# Patient Record
Sex: FEMALE | Race: WHITE | NOT HISPANIC OR LATINO | Employment: FULL TIME | URBAN - METROPOLITAN AREA
[De-identification: names, ages, dates, MRNs, and addresses within clinical notes are randomized per-mention and may not be internally consistent; named-entity substitution may affect disease eponyms.]

---

## 2022-09-23 ENCOUNTER — ANCILLARY PROCEDURE (OUTPATIENT)
Dept: GENERAL RADIOLOGY | Facility: CLINIC | Age: 37
End: 2022-09-23
Attending: EMERGENCY MEDICINE
Payer: COMMERCIAL

## 2022-09-23 ENCOUNTER — OFFICE VISIT (OUTPATIENT)
Dept: URGENT CARE | Facility: URGENT CARE | Age: 37
End: 2022-09-23
Payer: COMMERCIAL

## 2022-09-23 VITALS
SYSTOLIC BLOOD PRESSURE: 144 MMHG | HEIGHT: 66 IN | TEMPERATURE: 97.7 F | OXYGEN SATURATION: 99 % | HEART RATE: 55 BPM | DIASTOLIC BLOOD PRESSURE: 94 MMHG

## 2022-09-23 DIAGNOSIS — S59.902A INJURY OF LEFT ELBOW, INITIAL ENCOUNTER: ICD-10-CM

## 2022-09-23 DIAGNOSIS — R20.2 TINGLING OF LEFT UPPER EXTREMITY: ICD-10-CM

## 2022-09-23 DIAGNOSIS — S59.902A INJURY OF LEFT ELBOW, INITIAL ENCOUNTER: Primary | ICD-10-CM

## 2022-09-23 PROCEDURE — 99204 OFFICE O/P NEW MOD 45 MIN: CPT | Performed by: EMERGENCY MEDICINE

## 2022-09-23 PROCEDURE — 73080 X-RAY EXAM OF ELBOW: CPT | Mod: TC | Performed by: RADIOLOGY

## 2022-09-23 NOTE — PROGRESS NOTES
Assessment & Plan     Diagnosis:    (G45.505I) Injury of left elbow, initial encounter  (primary encounter diagnosis)    (R20.2) Tingling of left upper extremity  Comment: ulnar nerve distribution      Medical Decision Making:  Grazyna Mercedes is a 36 year old female who presents with elbow pain. Of note, she has no neurovascular compromise on exam - notes some tingling sensation but has no focal neurologic deficit on exam, sensation is intact, full range of motion of the hand and good capillary refill in all of his digits. X-ray of the elbow shows no evidence of fracture. Patient with swelling near the medial aspect of the olecranon; I suspect likely this is irritating the ulnar nerve.     Recommended RICE therapy, avoiding bending at the elbow as much as possible and follow up with orthopedics. Go to the ED if she has any numbness, paresthesias, pain with passive range of motion of wrist and hand, or other concerning symptoms. The patient understands these plans and was discharged home with no new questions.      Orlando Wright PA-C  [facility]    Subjective     Grazyna Mercedes is a 36 year old female who presents to clinic today for the following health issues:  Chief Complaint   Patient presents with     Elbow Injury     Last wknd, pt tripped over a root and fell on lt elbow and has been swollen and hasn't gotten better and has been causing irritation       HPI    MS Injury/Pain    Onset of symptoms was ~1 week ago.  Location: left elbow  Context:       The injury happened while walking and tripped; landing mainly on the left elbow.      Mechanism: fall       Patient experienced immediate pain, delayed swelling, was able to bear weight directly after injury, no deformity was noted by the patient  Course of symptoms is improving, but noticed some tingling in the left arm and 4th/5th fingers a couple days ago.   Severity: mild  Current and Associated symptoms: Pain, Swelling, Bruising and  "tingling.  Denies  Warmth and Redness  Aggravating Factors: flexion/extension  Therapies to improve symptoms include: ice    Patient denies any numbness or weakness in the arm or other injuries.     Review of Systems    See HPI    Objective      Vitals: BP (!) 144/94 (BP Location: Right arm, Patient Position: Sitting, Cuff Size: Adult Regular)   Pulse 55   Temp 97.7  F (36.5  C) (Temporal)   Ht 1.676 m (5' 6\")   SpO2 99%       Patient Vitals for the past 24 hrs:   BP Temp Temp src Pulse SpO2 Height   09/23/22 1221 (!) 144/94 97.7  F (36.5  C) Temporal 55 99 % 1.676 m (5' 6\")       Vital signs reviewed by: Orlando Wright PA-C    Physical Exam   Constitutional: Patient is alert and cooperative. No acute distress.  Neck: No midline C-spine TTP. Normal ROM.   Mouth: Mucous membranes are moist. Normal tongue and tonsil. Posterior oropharynx is clear.  Eyes: Conjunctivae, EOMI and lids are normal.  Cardiovascular: Regular rate and rhythm. Radial pulse 2+ bilaterally.  Pulmonary/Chest: Lungs are clear to auscultation throughout. Effort normal. No respiratory distress. No wheezes, rales or rhonchi.  Neurological: Alert and oriented x3.. Strength and sensation are intact and symmetric in the bilateral upper extremities.   MSK: swelling, ecchymosis and tenderness noted at left medial olecranon. Normal ROM at the elbow, wrist and shoulder. No erythema, warmth or areas of pointing or fluctuance.   Skin: No rash noted on visualized skin.  Psychiatric:The patient has a normal mood and affect.     Labs/Imaging:  Results for orders placed or performed in visit on 09/23/22   XR Elbow Left G/E 3 Views     Status: None    Narrative    XR ELBOW LEFT G/E 3 VIEWS 9/23/2022 12:35 PM    HISTORY: Injury of left elbow, initial encounter    COMPARISON: None.      Impression    IMPRESSION: No fracture. No degenerative changes. No effusion.    SHARONA DIAL MD         SYSTEM ID:  [id number]     Reading per radiology      Orlando " LUCILA Wright, September 23, 2022

## 2023-02-12 ENCOUNTER — HEALTH MAINTENANCE LETTER (OUTPATIENT)
Age: 38
End: 2023-02-12

## 2024-03-10 ENCOUNTER — HEALTH MAINTENANCE LETTER (OUTPATIENT)
Age: 39
End: 2024-03-10

## 2024-12-15 ENCOUNTER — HEALTH MAINTENANCE LETTER (OUTPATIENT)
Age: 39
End: 2024-12-15

## 2025-03-16 ENCOUNTER — HEALTH MAINTENANCE LETTER (OUTPATIENT)
Age: 40
End: 2025-03-16